# Patient Record
Sex: MALE | HISPANIC OR LATINO | URBAN - METROPOLITAN AREA
[De-identification: names, ages, dates, MRNs, and addresses within clinical notes are randomized per-mention and may not be internally consistent; named-entity substitution may affect disease eponyms.]

---

## 2019-11-27 ENCOUNTER — OFFICE VISIT (OUTPATIENT)
Dept: URBAN - METROPOLITAN AREA CLINIC 17 | Facility: CLINIC | Age: 69
End: 2019-11-27
Payer: COMMERCIAL

## 2019-11-27 DIAGNOSIS — H25.12 AGE-RELATED NUCLEAR CATARACT, LEFT EYE: ICD-10-CM

## 2019-11-27 DIAGNOSIS — H34.11 CENTRAL RETINAL ARTERY OCCLUSION, RIGHT EYE: Primary | ICD-10-CM

## 2019-11-27 DIAGNOSIS — E11.3312 TYPE 2 DIAB W MODERATE NONPRLF DIAB RTNOP W MACULAR EDEMA, LEFT EYE: ICD-10-CM

## 2019-11-27 DIAGNOSIS — E11.3491 TYPE 2 DIAB W SEVERE NONPRLF DIAB RTNOP W/O MACULAR EDEMA, RIGHT EYE: ICD-10-CM

## 2019-11-27 PROCEDURE — 92134 CPTRZ OPH DX IMG PST SGM RTA: CPT | Performed by: OPHTHALMOLOGY

## 2019-11-27 PROCEDURE — 92014 COMPRE OPH EXAM EST PT 1/>: CPT | Performed by: OPHTHALMOLOGY

## 2019-11-27 ASSESSMENT — INTRAOCULAR PRESSURE
OD: 40
OS: 15

## 2019-11-27 NOTE — IMPRESSION/PLAN
Impression: Central retinal artery occlusion, right eye: H34.11. OD. Condition: stable. Vision: vision affected. Plan: Discussed diagnosis in detail with patient. Exam OD shows an event in the past of poor circulation causing vision loss. No treatment is required at this time. Will continue to observe condition and or symptoms.

## 2019-11-27 NOTE — IMPRESSION/PLAN
Impression: Age-related nuclear cataract, left eye: H25.12. OS. Vision: vision affected. Plan: Discussed diagnosis in detail with patient. VA OS is blurry due to Cataract, recommend a Cataract evaluation.

## 2019-11-27 NOTE — IMPRESSION/PLAN
Impression: Type 2 diab w severe nonprlf diab rtnop w/o macular edema, right eye: e11.3491. OD. Condition: stable. Vision: vision affected. Plan: Discussed diagnosis in detail with patient. Exam shows minimal Diabetic changes OD. No treatment is recommended at this time, vision loss OD is due to CRVO OD in the past. Emphasized blood sugar control and advised to keep future appointments with PCP and/or Endocrinologist for the management of Diabetes. Recommend observation for now. OCT shows stable, no edema or thinning. VA OS is blurry due to Cataract, recommend a Cataract evaluation.

## 2019-11-27 NOTE — IMPRESSION/PLAN
Impression: Type 2 diab w moderate nonprlf diab rtnop w macular edema, left eye: M48.0883. OS. Condition: stable. Plan: Discussed diagnosis in detail with patient. Exam shows minimal Diabetic changes. No treatment is recommended at this time. Emphasized blood sugar control and advised to keep future appointments with PCP and/or Endocrinologist for the management of Diabetes. Recommend observation for now. OCT shows stable, no edema or thinning OS.

## 2020-01-27 ENCOUNTER — OFFICE VISIT (OUTPATIENT)
Dept: URBAN - METROPOLITAN AREA CLINIC 17 | Facility: CLINIC | Age: 70
End: 2020-01-27
Payer: COMMERCIAL

## 2020-01-27 DIAGNOSIS — H40.1313 PIGMENTARY GLAUCOMA, RIGHT EYE, SEVERE STAGE: ICD-10-CM

## 2020-01-27 PROCEDURE — 99214 OFFICE O/P EST MOD 30 MIN: CPT | Performed by: OPHTHALMOLOGY

## 2020-01-27 PROCEDURE — 92020 GONIOSCOPY: CPT | Performed by: OPHTHALMOLOGY

## 2020-01-27 RX ORDER — OFLOXACIN 3 MG/ML
0.3 % SOLUTION/ DROPS OPHTHALMIC
Qty: 5 | Refills: 1 | Status: ACTIVE
Start: 2020-01-27

## 2020-01-27 RX ORDER — PREDNISOLONE ACETATE 10 MG/ML
1 % SUSPENSION/ DROPS OPHTHALMIC
Qty: 10 | Refills: 1 | Status: ACTIVE
Start: 2020-01-27

## 2020-01-27 RX ORDER — BRIMONIDINE TARTRATE, TIMOLOL MALEATE 2; 5 MG/ML; MG/ML
SOLUTION/ DROPS OPHTHALMIC
Qty: 1 | Refills: 5 | Status: ACTIVE
Start: 2020-01-27

## 2020-01-27 ASSESSMENT — KERATOMETRY
OS: 41.25
OD: 41.13

## 2020-01-27 ASSESSMENT — INTRAOCULAR PRESSURE
OS: 20
OD: 39

## 2020-01-27 NOTE — IMPRESSION/PLAN
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 Condition: unstable. -IOP elevated OD Plan: Discussed diagnosis in detail with patient. Advised patient of condition. Discussed treatment options with patient. Recommend starting Combigan TID OD only.

## 2022-11-22 NOTE — IMPRESSION/PLAN
Impression: Age-related nuclear cataract, left eye: H25.12. Condition: established, worsening. Symptoms: could improve with surgery. Vision: vision affected. Plan: Cataract accounts for patient's complaints. Reviewed risks, benefits, and procedure. Patient desires surgery, schedule ce/iol OS, RL2, standard lens, distance refractive target, patient is clear for surgery in [place] [id number]. How Severe Is This Condition?: moderate